# Patient Record
Sex: MALE | Race: WHITE | NOT HISPANIC OR LATINO | URBAN - METROPOLITAN AREA
[De-identification: names, ages, dates, MRNs, and addresses within clinical notes are randomized per-mention and may not be internally consistent; named-entity substitution may affect disease eponyms.]

---

## 2017-01-09 ENCOUNTER — EMERGENCY (EMERGENCY)
Facility: HOSPITAL | Age: 22
LOS: 0 days | Discharge: HOME | End: 2017-01-09

## 2017-06-25 ENCOUNTER — EMERGENCY (EMERGENCY)
Facility: HOSPITAL | Age: 22
LOS: 0 days | Discharge: HOME | End: 2017-06-26

## 2017-06-25 DIAGNOSIS — N50.811 RIGHT TESTICULAR PAIN: ICD-10-CM

## 2017-06-25 DIAGNOSIS — R50.9 FEVER, UNSPECIFIED: ICD-10-CM

## 2017-06-27 DIAGNOSIS — J02.9 ACUTE PHARYNGITIS, UNSPECIFIED: ICD-10-CM

## 2017-06-27 DIAGNOSIS — R11.10 VOMITING, UNSPECIFIED: ICD-10-CM

## 2017-06-27 DIAGNOSIS — R19.7 DIARRHEA, UNSPECIFIED: ICD-10-CM

## 2017-06-27 DIAGNOSIS — B34.9 VIRAL INFECTION, UNSPECIFIED: ICD-10-CM

## 2017-06-27 DIAGNOSIS — R09.81 NASAL CONGESTION: ICD-10-CM

## 2017-06-27 DIAGNOSIS — K59.00 CONSTIPATION, UNSPECIFIED: ICD-10-CM

## 2017-06-27 DIAGNOSIS — R68.83 CHILLS (WITHOUT FEVER): ICD-10-CM

## 2018-11-05 ENCOUNTER — EMERGENCY (EMERGENCY)
Facility: HOSPITAL | Age: 23
LOS: 0 days | Discharge: HOME | End: 2018-11-05
Attending: EMERGENCY MEDICINE | Admitting: EMERGENCY MEDICINE

## 2018-11-05 VITALS
DIASTOLIC BLOOD PRESSURE: 89 MMHG | SYSTOLIC BLOOD PRESSURE: 130 MMHG | HEART RATE: 78 BPM | TEMPERATURE: 99 F | RESPIRATION RATE: 18 BRPM | OXYGEN SATURATION: 100 %

## 2018-11-05 VITALS
RESPIRATION RATE: 18 BRPM | HEART RATE: 74 BPM | WEIGHT: 250 LBS | OXYGEN SATURATION: 100 % | SYSTOLIC BLOOD PRESSURE: 127 MMHG | TEMPERATURE: 97 F | HEIGHT: 74 IN | DIASTOLIC BLOOD PRESSURE: 745 MMHG

## 2018-11-05 DIAGNOSIS — N50.811 RIGHT TESTICULAR PAIN: ICD-10-CM

## 2018-11-05 DIAGNOSIS — N50.819 TESTICULAR PAIN, UNSPECIFIED: ICD-10-CM

## 2018-11-05 DIAGNOSIS — R30.0 DYSURIA: ICD-10-CM

## 2018-11-05 PROBLEM — Z00.00 ENCOUNTER FOR PREVENTIVE HEALTH EXAMINATION: Status: ACTIVE | Noted: 2018-11-05

## 2018-11-05 LAB
APPEARANCE UR: CLEAR — SIGNIFICANT CHANGE UP
BACTERIA # UR AUTO: NEGATIVE — SIGNIFICANT CHANGE UP
BILIRUB UR-MCNC: NEGATIVE — SIGNIFICANT CHANGE UP
COD CRY URNS QL: NEGATIVE — SIGNIFICANT CHANGE UP
COLOR SPEC: YELLOW — SIGNIFICANT CHANGE UP
COMMENT - URINE: SIGNIFICANT CHANGE UP
DIFF PNL FLD: ABNORMAL
EPI CELLS # UR: NEGATIVE — SIGNIFICANT CHANGE UP
GLUCOSE UR QL: NEGATIVE MG/DL — SIGNIFICANT CHANGE UP
GRAN CASTS # UR COMP ASSIST: NEGATIVE — SIGNIFICANT CHANGE UP
HYALINE CASTS # UR AUTO: NEGATIVE — SIGNIFICANT CHANGE UP
KETONES UR-MCNC: NEGATIVE — SIGNIFICANT CHANGE UP
LEUKOCYTE ESTERASE UR-ACNC: NEGATIVE — SIGNIFICANT CHANGE UP
NITRITE UR-MCNC: NEGATIVE — SIGNIFICANT CHANGE UP
PH UR: 7 — SIGNIFICANT CHANGE UP (ref 5–8)
PROT UR-MCNC: NEGATIVE MG/DL — SIGNIFICANT CHANGE UP
RBC CASTS # UR COMP ASSIST: SIGNIFICANT CHANGE UP /HPF
SP GR SPEC: 1.02 — SIGNIFICANT CHANGE UP (ref 1.01–1.03)
TRI-PHOS CRY UR QL COMP ASSIST: NEGATIVE — SIGNIFICANT CHANGE UP
URATE CRY FLD QL MICRO: NEGATIVE — SIGNIFICANT CHANGE UP
UROBILINOGEN FLD QL: 0.2 MG/DL — SIGNIFICANT CHANGE UP (ref 0.2–0.2)
WBC UR QL: NEGATIVE — SIGNIFICANT CHANGE UP

## 2018-11-05 RX ORDER — AZITHROMYCIN 500 MG/1
1 TABLET, FILM COATED ORAL DAILY
Qty: 0 | Refills: 0 | Status: DISCONTINUED | OUTPATIENT
Start: 2018-11-05 | End: 2018-11-05

## 2018-11-05 RX ORDER — CEFTRIAXONE 500 MG/1
250 INJECTION, POWDER, FOR SOLUTION INTRAMUSCULAR; INTRAVENOUS ONCE
Qty: 0 | Refills: 0 | Status: COMPLETED | OUTPATIENT
Start: 2018-11-05 | End: 2018-11-05

## 2018-11-05 RX ORDER — METRONIDAZOLE 500 MG
2000 TABLET ORAL ONCE
Qty: 0 | Refills: 0 | Status: COMPLETED | OUTPATIENT
Start: 2018-11-05 | End: 2018-11-05

## 2018-11-05 RX ADMIN — Medication 2000 MILLIGRAM(S): at 16:33

## 2018-11-05 RX ADMIN — AZITHROMYCIN 1 GRAM(S): 500 TABLET, FILM COATED ORAL at 16:34

## 2018-11-05 RX ADMIN — CEFTRIAXONE 250 MILLIGRAM(S): 500 INJECTION, POWDER, FOR SOLUTION INTRAMUSCULAR; INTRAVENOUS at 16:34

## 2018-11-05 NOTE — ED PROVIDER NOTE - OBJECTIVE STATEMENT
22 y/o male presents to the ED c/o "I have right sided testicular pain on-off for 3 weeks with dysuria. I was seen at  and I had a sonogram showing orchitis and varicocele. I was treated with PO antibiotics for 10 days. It felt better and now its bothering me again. I am waiting to see Urology next month." no abdominal pain/ back pain/ penile rash or discharge

## 2018-11-05 NOTE — ED PROVIDER NOTE - ATTENDING CONTRIBUTION TO CARE
Several weeks of testicular pain described as pulling that ossiculated between right and left testicle. Mild intermittent dysuria without hematuria, discharge or sediment. Pain is non radiating no fevers or chills. 1 sexual partner. no sti hx. Had prior ultrsaound at onset of symptoms and was diagnosed with orchitis and varicocele on left. Had abx (only PO). which seemed to resolve symptoms. No trauma.   EXAM: nl external genitalia, non tender. no mass, meatus patent. +cremasteric bl. No rash or lesions. no lymphadenopathy appreciated.  Non-specific chest pain with reassuring exam. Will eval for and cover for GC/CT/Trich. Pt has outpatient uro follow up. Pt voiced understanding and agrees with plan.

## 2018-11-05 NOTE — ED PROVIDER NOTE - CONDUCTED A DETAILED DISCUSSION WITH PATIENT AND/OR GUARDIAN REGARDING, MDM
need for outpatient follow-up/radiology results/lab results return to ED if symptoms worsen, persist or questions arise/radiology results/lab results/need for outpatient follow-up

## 2018-11-05 NOTE — ED PROVIDER NOTE - MEDICAL DECISION MAKING DETAILS
Patient was given strict return and follow up precautions. The patient has been informed of all concerning US unremarkable. UA unremarkable. Pt understands signs and symptoms to return to Emergency Department, the necessity to follow up with PMD/Clinic/follow up provided within 2-3 days was explained, and the patient reports understanding of above with capacity and insight.

## 2018-11-06 LAB
C TRACH RRNA SPEC QL NAA+PROBE: SIGNIFICANT CHANGE UP
N GONORRHOEA RRNA SPEC QL NAA+PROBE: SIGNIFICANT CHANGE UP
SPECIMEN SOURCE: SIGNIFICANT CHANGE UP

## 2018-12-14 ENCOUNTER — APPOINTMENT (OUTPATIENT)
Dept: UROLOGY | Facility: CLINIC | Age: 23
End: 2018-12-14

## 2020-08-05 ENCOUNTER — TRANSCRIPTION ENCOUNTER (OUTPATIENT)
Age: 25
End: 2020-08-05

## 2022-03-18 NOTE — ED PROVIDER NOTE - CPE EDP [facility] NORM
[FreeTextEntry1] : Interval Hx:  recent stress wife w breadt cancer, not a good week - diet was "off this past week"\par \par Quality: Type 1\par Severity: poor control, severe\par Duration: 2006\par Onset: blood test \par \par ASSOCIATED SYMPTOMS/COMPLICATIONS: \par 10/12/21 eye exam no DR\par denies neuropathy.\par 10/2021 neg alb/Cr, GFR 89\par \par MODIFYING FACTORS:\par Lifetsyle: Diet: eats 3 meals daily, BF high is carb, trying to reduce snacking at bedtime. Exercise: moderately 3-4 days/week- lift weights and walking. \par Current DM meds: \par Lantus 40 units in am\par Novolog: ICR 1:10, ISF 1:30, target 100\par \par SMBG - Yuan\par CGM downloaded and reviewed: Yuan\par CGM downloaded and reviewed: Yuan\par Average glucose: 175\par % time CGM active:  38\par Glucose variability (target <36%): 38\par \par % VERY HIGH (>250): 13\par % HIGH (181-250): 33\par % TARGET (): 52\par % LOW (54-69): 2\par % VERY LOW (<54): 0\par \par Interpretation: week 1 imrprved control, week 2 erratic numbers - expected with stress/erratic eating habits\par \par \par \par  ---------------------------------------------------------------------------------------\par Thyroid nodule since 2006, benign Right nodule FNA in 2006. \par Denies anterior neck pain, dysphagia or voice change\par -------------------------------------------------------------------------------------------------\par Hypothyroid since 2006\par adherent with LT4 125 mcg daily on empty stomach\par -----------------------------------------------------------------------------------------------\par HLD -  adherent Rosuvastatin 5 mg daily\par \par \par \par \par \par  normal...